# Patient Record
Sex: MALE | Race: WHITE | NOT HISPANIC OR LATINO | Employment: PART TIME | URBAN - METROPOLITAN AREA
[De-identification: names, ages, dates, MRNs, and addresses within clinical notes are randomized per-mention and may not be internally consistent; named-entity substitution may affect disease eponyms.]

---

## 2023-03-27 ENCOUNTER — OFFICE VISIT (OUTPATIENT)
Dept: URGENT CARE | Facility: CLINIC | Age: 58
End: 2023-03-27

## 2023-03-27 VITALS
BODY MASS INDEX: 36.28 KG/M2 | HEART RATE: 83 BPM | DIASTOLIC BLOOD PRESSURE: 87 MMHG | TEMPERATURE: 98.3 F | OXYGEN SATURATION: 98 % | HEIGHT: 68 IN | SYSTOLIC BLOOD PRESSURE: 158 MMHG | WEIGHT: 239.4 LBS

## 2023-03-27 DIAGNOSIS — Z02.4 DRIVER'S PERMIT PHYSICAL EXAMINATION: Primary | ICD-10-CM

## 2023-03-27 NOTE — PROGRESS NOTES
[facility]        NAME: Yolie Gu is a 65 y o  male  : 1965    MRN: [id number]  DATE: 2023  TIME: 3:07 PM    Assessment and Plan   's permit physical examination [Z02 4]  1  's permit physical examination              Patient Instructions     Patient is qualified  See scanned physical form  Chief Complaint     Chief Complaint   Patient presents with   • Annual Exam     Drivers license         History of Present Illness     65 y o  male presents to clinic today for a  permit physical  Patient denies any known chronic medical issues and does not take any OTC or prescribed medications  Denies family history of sudden or early cardiac death, recent orthopedic injury, concussion, or COVID  Patient is feeling well today with no complaints  Review of Systems     Review of Systems   Constitutional: Negative for activity change, fatigue, fever and unexpected weight change  HENT: Negative for hearing loss and trouble swallowing  Eyes: Negative for photophobia and visual disturbance  Respiratory: Negative for shortness of breath  Cardiovascular: Negative for chest pain and palpitations  Gastrointestinal: Negative for abdominal pain, constipation and diarrhea  Musculoskeletal: Negative for arthralgias, myalgias and neck pain  Skin: Negative for rash  Neurological: Negative for dizziness, seizures, syncope, weakness, light-headedness and headaches  Hematological: Negative for adenopathy  Current Medications   No current outpatient medications on file  Current Allergies     Allergies as of 2023   • (No Known Allergies)            The following portions of the patient's history were reviewed and updated as appropriate: allergies, current medications, past family history, past medical history, past social history, past surgical history and problem list      History reviewed  No pertinent past medical history  History reviewed   No "pertinent surgical history  History reviewed  No pertinent family history  Medications have been verified  Objective     /87   Pulse 83   Temp 98 3 °F (36 8 °C)   Ht 5' 8\" (1 727 m)   Wt 109 kg (239 lb 6 4 oz)   SpO2 98%   BMI 36 40 kg/m²        Physical Exam     Physical Exam  Vitals and nursing note reviewed  Constitutional:       General: Not in acute distress  Appearance: Normal appearance  Does not appear ill  HENT:      Head: Normocephalic and atraumatic  Right Ear: Tympanic membrane normal       Left Ear: Tympanic membrane normal       Nose: Nose normal       Mouth/Throat:      Mouth: Mucous membranes are moist       Pharynx: Oropharynx is clear  Cardiovascular:      Rate and Rhythm: Normal rate and regular rhythm  Pulses: Normal pulses  Heart sounds: Normal heart sounds  Pulmonary:      Effort: Pulmonary effort is normal       Breath sounds: Normal breath sounds  Lymphadenopathy:      Cervical: No cervical adenopathy  Skin:     General: Skin is warm and dry  Findings: No rash  Neurological:      Mental Status: Awake, Alert, and Oriented      "